# Patient Record
Sex: FEMALE | Race: WHITE | NOT HISPANIC OR LATINO | URBAN - METROPOLITAN AREA
[De-identification: names, ages, dates, MRNs, and addresses within clinical notes are randomized per-mention and may not be internally consistent; named-entity substitution may affect disease eponyms.]

---
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
## 2023-11-24 NOTE — ED PROVIDER NOTE - PSYCHIATRIC NEGATIVE STATEMENT, MLM
The patient location is: [place]  The chief complaint leading to consultation is: nutrition referral    Visit type: audio only    Face to Face time with patient: 24 minutes  29 minutes of total time spent on the encounter, which includes face to face time and non-face to face time preparing to see the patient (eg, review of tests), Obtaining and/or reviewing separately obtained history, Documenting clinical information in the electronic or other health record, Independently interpreting results (not separately reported) and communicating results to the patient/family/caregiver, or Care coordination (not separately reported).         Each patient to whom he or she provides medical services by telemedicine is:  (1) informed of the relationship between the physician and patient and the respective role of any other health care provider with respect to management of the patient; and (2) notified that he or she may decline to receive medical services by telemedicine and may withdraw from such care at any time.    Notes:   Nutrition Assessment       Client name:  Jodi Bustos  :  1993  Age:  29 y.o.  Gender:  female    Client states:  referred by Krupa Kaiser MD with a diagnosis of:   -Class 2 severe obesity due to excess calories with serious comorbidity and body mass index (BMI) of 38.0 to 38.9 in adult     Recently completed annual visit with primary care physician.   Interested in losing weight.    Reports losing 20-25 lbs within the last year.  Weight is at a plateau since August, due to lack of time to eat consistent meals since she is a 2nd .   Exercise has also suffered some due to lack of time.  Previously was training daily for an upcoming 5k.  Exercise now is only completed on the weekends (Saturday and  3 miles walk/jobs in her neighborhood).    Interested in finding ways to resume exercise, develop consistent eating habits again, and learn about local  "meal prep companies. Also would like to know how many calories she should consume on a daily basis.    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   Once she arrives home, patient does not eat anything else due to being tired and going to sleep right away.    Food intake is more on the weekends.  May dine out at Mexican restaurants.    Daily drinks: juices, milo's lemonade once home, water  Alcohol: occasionally// maybe once per month depending on events/holidays  Does not drink any sodas or caffeine.          Anthropometrics  Height:  61"        RECENT WEIGHTS      Weight (lb) Weight (kg) BMI (Calculated)   10/23/2020 187  84.823  34.2    11/22/2021 210  95.255  39.7    11/29/2022 204  92.534  38.6    10/10/2023 203.93  92.5  38.6    11/21/2023 205.91  93.4  38.9          Clinical Signs/Symptoms  N/V/D:  none noted  Appetite:  good       Past Medical History:   Diagnosis Date    Asthma     Asthma 11/3/2020 2:59:11 PM    [facility] Historical - Respiratory: Asthma-No Additional Notes    Asthma 11/3/2020 2:59:11 PM    [facility] Historical - Respiratory: Asthma-No Additional Notes       Past Surgical History:   Procedure Laterality Date    ADENOIDECTOMY      TONSILLECTOMY         Medications    has a current medication list which includes the following prescription(s): etonogestrel-ethinyl estradiol and valsartan.    Vitamins, Minerals, and/or Supplements:  not discussed     Food/Medication Interactions:  Reviewed     Food Allergies or Intolerances:  NKFA noted in chart     Social History    Marital status:  Single  Employment:  yes,     Social History     Tobacco Use    Smoking status: Never    Smokeless tobacco: Never   Substance Use Topics    Alcohol use: Yes        Lab Reports   Sodium   Date Value Ref Range Status   10/24/2023 140 136 - 145 mmol/L Final     Potassium   Date Value Ref Range Status "   10/24/2023 4.0 3.5 - 5.1 mmol/L Final     Chloride   Date Value Ref Range Status   10/24/2023 109 95 - 110 mmol/L Final     CO2   Date Value Ref Range Status   10/24/2023 22 (L) 23 - 29 mmol/L Final     Glucose   Date Value Ref Range Status   10/24/2023 83 70 - 110 mg/dL Final     BUN   Date Value Ref Range Status   10/24/2023 12 6 - 20 mg/dL Final     Creatinine   Date Value Ref Range Status   10/24/2023 0.7 0.5 - 1.4 mg/dL Final     Calcium   Date Value Ref Range Status   10/24/2023 9.1 8.7 - 10.5 mg/dL Final     Total Protein   Date Value Ref Range Status   10/24/2023 6.7 6.0 - 8.4 g/dL Final     Albumin   Date Value Ref Range Status   10/24/2023 3.2 (L) 3.5 - 5.2 g/dL Final     Total Bilirubin   Date Value Ref Range Status   10/24/2023 0.3 0.1 - 1.0 mg/dL Final     Comment:     For infants and newborns, interpretation of results should be based  on gestational age, weight and in agreement with clinical  observations.    Premature Infant recommended reference ranges:  Up to 24 hours.............<8.0 mg/dL  Up to 48 hours............<12.0 mg/dL  3-5 days..................<15.0 mg/dL  6-29 days.................<15.0 mg/dL       Alkaline Phosphatase   Date Value Ref Range Status   10/24/2023 51 (L) 55 - 135 U/L Final     AST   Date Value Ref Range Status   10/24/2023 11 10 - 40 U/L Final     ALT   Date Value Ref Range Status   10/24/2023 9 (L) 10 - 44 U/L Final     Anion Gap   Date Value Ref Range Status   10/24/2023 9 8 - 16 mmol/L Final      Lab Results   Component Value Date    WBC 6.33 10/24/2023    HGB 11.9 (L) 10/24/2023    HCT 39.9 10/24/2023    MCV 80 (L) 10/24/2023     10/24/2023        Lab Results   Component Value Date    CHOL 140 10/24/2023     Lab Results   Component Value Date    HDL 49 10/24/2023     Lab Results   Component Value Date    LDLCALC 75.6 10/24/2023     Lab Results   Component Value Date    TRIG 77 10/24/2023     Lab Results   Component Value Date    CHOLHDL 35.0 10/24/2023      Lab Results   Component Value Date    HGBA1C 5.3 10/24/2023     BP Readings from Last 1 Encounters:   11/21/23 124/86       Food History  Provided above    Exercise History:  provided above    Cultural/Spiritual/Personal Preferences:  None identified    Support System:  none noted    State of Change:  Preparation    Barriers to Change:  time management     Diagnosis    obesity related to previous excess energy intake as evidenced by BMI 38.    Intervention    RMR (Method:  [place] St. Jeor):  1605 kcal  Activity Factor:  1.3    SAJI:  2086 kcal    Goals:  1.  1700 calories daily  2.  Consider local meal prep companies: [employer] ([employer]) or [employer] ([employer])  3.  Begin consuming breakfast, choose options available at meal prep companies or consider protein + fiber choices as discussed (examples: egg bites made with non-starchy vegetables or whole grain waffles with peanut butter or boiled eggs with side of fruit)    Nutrition Education  The following education was provided to the patient:  Discussed weight management.  Suggested dietary modifications based on current dietary behaviors and individual food preferences.    Patient verbalized understanding of nutrition education and recommendations received.    Handouts Provided  none    Monitoring/Evaluation    Monitor the following:  Weight  BMI  Caloric intake      Follow Up Plan:  as needed   no known mental health issues.